# Patient Record
Sex: FEMALE | Employment: PART TIME | URBAN - METROPOLITAN AREA
[De-identification: names, ages, dates, MRNs, and addresses within clinical notes are randomized per-mention and may not be internally consistent; named-entity substitution may affect disease eponyms.]

---

## 2020-05-20 DIAGNOSIS — Z11.59 ENCOUNTER FOR SCREENING FOR OTHER VIRAL DISEASES: Primary | ICD-10-CM

## 2020-06-01 DIAGNOSIS — Z11.59 ENCOUNTER FOR SCREENING FOR OTHER VIRAL DISEASES: ICD-10-CM

## 2020-06-01 LAB
SARS-COV-2 RNA SPEC QL NAA+PROBE: NOT DETECTED
SPECIMEN SOURCE: NORMAL

## 2020-06-01 PROCEDURE — 99207 ZZC NO BILLABLE SERVICE THIS VISIT: CPT

## 2020-06-04 ENCOUNTER — HOSPITAL ENCOUNTER (OUTPATIENT)
Facility: CLINIC | Age: 40
Discharge: HOME | End: 2020-06-04
Attending: SPECIALIST | Admitting: SPECIALIST
Payer: COMMERCIAL

## 2020-06-04 VITALS
DIASTOLIC BLOOD PRESSURE: 48 MMHG | HEART RATE: 50 BPM | HEIGHT: 62 IN | OXYGEN SATURATION: 100 % | RESPIRATION RATE: 11 BRPM | WEIGHT: 130 LBS | BODY MASS INDEX: 23.92 KG/M2 | SYSTOLIC BLOOD PRESSURE: 90 MMHG

## 2020-06-04 LAB — COLONOSCOPY: NORMAL

## 2020-06-04 PROCEDURE — G0500 MOD SEDAT ENDO SERVICE >5YRS: HCPCS | Performed by: SPECIALIST

## 2020-06-04 PROCEDURE — 45385 COLONOSCOPY W/LESION REMOVAL: CPT | Performed by: SPECIALIST

## 2020-06-04 PROCEDURE — 99153 MOD SED SAME PHYS/QHP EA: CPT | Performed by: SPECIALIST

## 2020-06-04 PROCEDURE — 88305 TISSUE EXAM BY PATHOLOGIST: CPT | Performed by: SPECIALIST

## 2020-06-04 PROCEDURE — 25800030 ZZH RX IP 258 OP 636: Performed by: SPECIALIST

## 2020-06-04 PROCEDURE — 88305 TISSUE EXAM BY PATHOLOGIST: CPT | Mod: 26 | Performed by: SPECIALIST

## 2020-06-04 PROCEDURE — 25000128 H RX IP 250 OP 636: Performed by: SPECIALIST

## 2020-06-04 RX ORDER — FENTANYL CITRATE 50 UG/ML
INJECTION, SOLUTION INTRAMUSCULAR; INTRAVENOUS PRN
Status: DISCONTINUED | OUTPATIENT
Start: 2020-06-04 | End: 2020-06-04 | Stop reason: HOSPADM

## 2020-06-04 RX ORDER — SODIUM CHLORIDE 9 MG/ML
INJECTION, SOLUTION INTRAVENOUS CONTINUOUS PRN
Status: DISCONTINUED | OUTPATIENT
Start: 2020-06-04 | End: 2020-06-04 | Stop reason: HOSPADM

## 2020-06-04 RX ORDER — ONDANSETRON 2 MG/ML
4 INJECTION INTRAMUSCULAR; INTRAVENOUS
Status: COMPLETED | OUTPATIENT
Start: 2020-06-04 | End: 2020-06-04

## 2020-06-04 RX ORDER — LIDOCAINE 40 MG/G
CREAM TOPICAL
Status: DISCONTINUED | OUTPATIENT
Start: 2020-06-04 | End: 2020-06-04 | Stop reason: HOSPADM

## 2020-06-04 ASSESSMENT — MIFFLIN-ST. JEOR: SCORE: 1207.93

## 2020-06-04 NOTE — H&P
"Pre-Endoscopy History and Physical     Yolie Trinidad MRN# [id number]   YOB: 1980 Age: 40 year old     Date of Procedure: 6/4/2020  Primary care provider: No Ref-Primary, Physician  Type of Endoscopy: Colonoscopy with possible biopsy, possible polypectomy  Reason for Procedure: abdominal pain, bloody mucus in stool, change in bowel habits  Type of Anesthesia Anticipated: Conscious Sedation    HPI:    April is a 40 year old female who will be undergoing the above procedure.      A history and physical has been performed. The patient's medications and allergies have been reviewed. The risks and benefits of the procedure and the sedation options and risks were discussed with the patient.  All questions were answered and informed consent was obtained.      She denies a personal or family history of anesthesia complications or bleeding disorders.     There is no problem list on file for this patient.       History reviewed. No pertinent past medical history.     Past Surgical History:   Procedure Laterality Date     GYN SURGERY  1999    [facility]       Social History     Tobacco Use     Smoking status: Never Smoker     Smokeless tobacco: Never Used   Substance Use Topics     Alcohol use: Yes     Comment: occas       History reviewed. No pertinent family history.    Prior to Admission medications    Not on File       No Known Allergies     REVIEW OF SYSTEMS:   5 point ROS negative except as noted above in HPI, including Gen., Resp., CV, GI &  system review.    PHYSICAL EXAM:   /58   Resp 16   Ht 1.575 m (5' 2\")   Wt 59 kg (130 lb)   LMP 05/30/2020   SpO2 100%   BMI 23.78 kg/m   Estimated body mass index is 23.78 kg/m  as calculated from the following:    Height as of this encounter: 1.575 m (5' 2\").    Weight as of this encounter: 59 kg (130 lb).   GENERAL APPEARANCE: alert, and oriented  MENTAL STATUS: alert  AIRWAY EXAM: Mallampatti Class I (visualization of the soft palate, fauces, uvula, anterior " and posterior pillars)  RESP: lungs clear to auscultation - no rales, rhonchi or wheezes  CV: regular rates and rhythm  DIAGNOSTICS:    Not indicated    IMPRESSION   ASA Class 1 - Healthy patient, no medical problems    PLAN:   Plan for Colonoscopy with possible biopsy, possible polypectomy. We discussed the risks, benefits and alternatives and the patient wished to proceed.    The above has been forwarded to the consulting provider.      Signed Electronically by: Adrien Wick MD  June 4, 2020

## 2020-06-04 NOTE — PROGRESS NOTES
After the procedure, I went in and reviewed the procedure with April and gave her the colonoscopy report; at that point, she was fine and had no complaints. Minutes later, she felt poorly - and Yaquelin Ashby notified me of a change in her vital signs (see her note for details). When I went to see her, her blood pressure was low normal and her heart rate was in the 70's, there were no overt signs or symptoms to suggest a complication from the procedure.     I just went back and saw her, and her blood pressure was low (90 systolic) and HR in the 50's. She is getting IV fluids. She looks well, has good color to her skin, is smiling, pleasant and interacting appropriately. She claims to have a low blood pressure and has fainted in the past. She denies abdominal pain. Her abdomen is non tender.     This is probably a vasovagal episode; she seems to be getting better with IV fluids. Will continue to observe her a while longer.    Adrien Wick    ADDENDUM 13:20    She is sitting up on the side of the bed. She appears well, is smiling, pleasant and interactive. No lightheadedness or complaints. Okay to discharge to home.     Adrien Wick

## 2020-06-04 NOTE — OR NURSING
Pt c/o nausea. Pale, diaphoretic. HR 130s. B/p systolic 90s. MD notified. Wretching. Iv fluids tarted and zofran given. Pt resting in bed and feeling better HR 70s.

## 2020-10-07 LAB — COPATH REPORT: NORMAL

## (undated) RX ORDER — FENTANYL CITRATE 50 UG/ML
INJECTION, SOLUTION INTRAMUSCULAR; INTRAVENOUS
Status: DISPENSED
Start: 2020-06-04

## (undated) RX ORDER — ONDANSETRON 2 MG/ML
INJECTION INTRAMUSCULAR; INTRAVENOUS
Status: DISPENSED
Start: 2020-06-04